# Patient Record
Sex: FEMALE | Race: WHITE | NOT HISPANIC OR LATINO
[De-identification: names, ages, dates, MRNs, and addresses within clinical notes are randomized per-mention and may not be internally consistent; named-entity substitution may affect disease eponyms.]

---

## 2017-06-14 ENCOUNTER — APPOINTMENT (OUTPATIENT)
Dept: ANTEPARTUM | Facility: CLINIC | Age: 42
End: 2017-06-14

## 2017-06-14 ENCOUNTER — ASOB RESULT (OUTPATIENT)
Age: 42
End: 2017-06-14

## 2017-08-01 ENCOUNTER — OUTPATIENT (OUTPATIENT)
Dept: OUTPATIENT SERVICES | Facility: HOSPITAL | Age: 42
LOS: 1 days | End: 2017-08-01
Payer: COMMERCIAL

## 2017-08-01 DIAGNOSIS — O26.899 OTHER SPECIFIED PREGNANCY RELATED CONDITIONS, UNSPECIFIED TRIMESTER: ICD-10-CM

## 2017-08-01 DIAGNOSIS — Z3A.00 WEEKS OF GESTATION OF PREGNANCY NOT SPECIFIED: ICD-10-CM

## 2017-08-01 PROCEDURE — 59025 FETAL NON-STRESS TEST: CPT

## 2017-08-01 PROCEDURE — 59025 FETAL NON-STRESS TEST: CPT | Mod: 26

## 2017-08-01 PROCEDURE — G0463: CPT

## 2017-09-20 ENCOUNTER — OUTPATIENT (OUTPATIENT)
Dept: OUTPATIENT SERVICES | Facility: HOSPITAL | Age: 42
LOS: 1 days | End: 2017-09-20
Payer: COMMERCIAL

## 2017-09-20 DIAGNOSIS — O26.899 OTHER SPECIFIED PREGNANCY RELATED CONDITIONS, UNSPECIFIED TRIMESTER: ICD-10-CM

## 2017-09-20 DIAGNOSIS — Z3A.00 WEEKS OF GESTATION OF PREGNANCY NOT SPECIFIED: ICD-10-CM

## 2017-09-20 LAB
APPEARANCE UR: CLEAR — SIGNIFICANT CHANGE UP
BACTERIA # UR AUTO: ABNORMAL /HPF
BILIRUB UR-MCNC: NEGATIVE — SIGNIFICANT CHANGE UP
COLOR SPEC: SIGNIFICANT CHANGE UP
DIFF PNL FLD: NEGATIVE — SIGNIFICANT CHANGE UP
EPI CELLS # UR: SIGNIFICANT CHANGE UP /HPF
GLUCOSE UR QL: NEGATIVE — SIGNIFICANT CHANGE UP
KETONES UR-MCNC: ABNORMAL
LEUKOCYTE ESTERASE UR-ACNC: ABNORMAL
NITRITE UR-MCNC: NEGATIVE — SIGNIFICANT CHANGE UP
PH UR: 6.5 — SIGNIFICANT CHANGE UP (ref 5–8)
PROT UR-MCNC: NEGATIVE — SIGNIFICANT CHANGE UP
RBC CASTS # UR COMP ASSIST: SIGNIFICANT CHANGE UP /HPF (ref 0–2)
SP GR SPEC: 1.01 — LOW (ref 1.01–1.02)
UROBILINOGEN FLD QL: NEGATIVE — SIGNIFICANT CHANGE UP
WBC UR QL: SIGNIFICANT CHANGE UP /HPF (ref 0–5)

## 2017-09-20 PROCEDURE — 59025 FETAL NON-STRESS TEST: CPT | Mod: 26

## 2017-09-20 PROCEDURE — G0463: CPT

## 2017-09-20 PROCEDURE — 59025 FETAL NON-STRESS TEST: CPT

## 2017-09-20 PROCEDURE — 87086 URINE CULTURE/COLONY COUNT: CPT

## 2017-09-20 PROCEDURE — 81001 URINALYSIS AUTO W/SCOPE: CPT

## 2017-09-21 LAB
CULTURE RESULTS: NO GROWTH — SIGNIFICANT CHANGE UP
SPECIMEN SOURCE: SIGNIFICANT CHANGE UP

## 2017-10-17 ENCOUNTER — OUTPATIENT (OUTPATIENT)
Dept: OUTPATIENT SERVICES | Facility: HOSPITAL | Age: 42
LOS: 1 days | End: 2017-10-17
Payer: COMMERCIAL

## 2017-10-17 DIAGNOSIS — O26.899 OTHER SPECIFIED PREGNANCY RELATED CONDITIONS, UNSPECIFIED TRIMESTER: ICD-10-CM

## 2017-10-17 DIAGNOSIS — Z3A.00 WEEKS OF GESTATION OF PREGNANCY NOT SPECIFIED: ICD-10-CM

## 2017-10-17 PROCEDURE — 59025 FETAL NON-STRESS TEST: CPT | Mod: 26

## 2017-10-17 PROCEDURE — G0463: CPT

## 2017-10-17 PROCEDURE — 59025 FETAL NON-STRESS TEST: CPT

## 2017-10-31 ENCOUNTER — INPATIENT (INPATIENT)
Facility: HOSPITAL | Age: 42
LOS: 2 days | Discharge: HOME | End: 2017-11-03
Attending: OBSTETRICS & GYNECOLOGY | Admitting: OBSTETRICS & GYNECOLOGY
Payer: COMMERCIAL

## 2017-10-31 VITALS — WEIGHT: 171.96 LBS | HEIGHT: 67 IN

## 2017-10-31 DIAGNOSIS — Z00.00 ENCOUNTER FOR GENERAL ADULT MEDICAL EXAMINATION WITHOUT ABNORMAL FINDINGS: ICD-10-CM

## 2017-10-31 LAB
BASOPHILS # BLD AUTO: 0 K/UL — SIGNIFICANT CHANGE UP (ref 0–0.2)
BASOPHILS NFR BLD AUTO: 0.3 % — SIGNIFICANT CHANGE UP (ref 0–2)
EOSINOPHIL # BLD AUTO: 0.1 K/UL — SIGNIFICANT CHANGE UP (ref 0–0.5)
EOSINOPHIL NFR BLD AUTO: 0.6 % — SIGNIFICANT CHANGE UP (ref 0–6)
HCT VFR BLD CALC: 34.9 % — SIGNIFICANT CHANGE UP (ref 34.5–45)
HGB BLD-MCNC: 12.2 G/DL — SIGNIFICANT CHANGE UP (ref 11.5–15.5)
LYMPHOCYTES # BLD AUTO: 2.1 K/UL — SIGNIFICANT CHANGE UP (ref 1–3.3)
LYMPHOCYTES # BLD AUTO: 25.3 % — SIGNIFICANT CHANGE UP (ref 13–44)
MCHC RBC-ENTMCNC: 32.9 PG — SIGNIFICANT CHANGE UP (ref 27–34)
MCHC RBC-ENTMCNC: 34.9 GM/DL — SIGNIFICANT CHANGE UP (ref 32–36)
MCV RBC AUTO: 94.4 FL — SIGNIFICANT CHANGE UP (ref 80–100)
MONOCYTES # BLD AUTO: 0.5 K/UL — SIGNIFICANT CHANGE UP (ref 0–0.9)
MONOCYTES NFR BLD AUTO: 6.2 % — SIGNIFICANT CHANGE UP (ref 2–14)
NEUTROPHILS # BLD AUTO: 5.7 K/UL — SIGNIFICANT CHANGE UP (ref 1.8–7.4)
NEUTROPHILS NFR BLD AUTO: 67.5 % — SIGNIFICANT CHANGE UP (ref 43–77)
PLATELET # BLD AUTO: 170 K/UL — SIGNIFICANT CHANGE UP (ref 150–400)
RBC # BLD: 3.7 M/UL — LOW (ref 3.8–5.2)
RBC # FLD: 11.4 % — SIGNIFICANT CHANGE UP (ref 10.3–14.5)
WBC # BLD: 8.5 K/UL — SIGNIFICANT CHANGE UP (ref 3.8–10.5)
WBC # FLD AUTO: 8.5 K/UL — SIGNIFICANT CHANGE UP (ref 3.8–10.5)

## 2017-10-31 RX ORDER — SODIUM CHLORIDE 9 MG/ML
1000 INJECTION, SOLUTION INTRAVENOUS
Qty: 0 | Refills: 0 | Status: DISCONTINUED | OUTPATIENT
Start: 2017-10-31 | End: 2017-11-01

## 2017-10-31 RX ORDER — SODIUM CHLORIDE 9 MG/ML
1000 INJECTION, SOLUTION INTRAVENOUS ONCE
Qty: 0 | Refills: 0 | Status: DISCONTINUED | OUTPATIENT
Start: 2017-10-31 | End: 2017-11-01

## 2017-10-31 RX ORDER — CITRIC ACID/SODIUM CITRATE 300-500 MG
15 SOLUTION, ORAL ORAL EVERY 4 HOURS
Qty: 0 | Refills: 0 | Status: DISCONTINUED | OUTPATIENT
Start: 2017-10-31 | End: 2017-11-01

## 2017-10-31 RX ORDER — OXYTOCIN 10 UNIT/ML
333.33 VIAL (ML) INJECTION
Qty: 20 | Refills: 0 | Status: DISCONTINUED | OUTPATIENT
Start: 2017-10-31 | End: 2017-11-01

## 2017-10-31 RX ADMIN — SODIUM CHLORIDE 125 MILLILITER(S): 9 INJECTION, SOLUTION INTRAVENOUS at 23:23

## 2017-11-01 LAB
BLD GP AB SCN SERPL QL: NEGATIVE — SIGNIFICANT CHANGE UP
RH IG SCN BLD-IMP: POSITIVE — SIGNIFICANT CHANGE UP
T PALLIDUM AB TITR SER: NEGATIVE — SIGNIFICANT CHANGE UP

## 2017-11-01 RX ORDER — AER TRAVELER 0.5 G/1
1 SOLUTION RECTAL; TOPICAL EVERY 4 HOURS
Qty: 0 | Refills: 0 | Status: DISCONTINUED | OUTPATIENT
Start: 2017-11-01 | End: 2017-11-03

## 2017-11-01 RX ORDER — IBUPROFEN 200 MG
600 TABLET ORAL EVERY 6 HOURS
Qty: 0 | Refills: 0 | Status: DISCONTINUED | OUTPATIENT
Start: 2017-11-01 | End: 2017-11-03

## 2017-11-01 RX ORDER — DOCUSATE SODIUM 100 MG
100 CAPSULE ORAL
Qty: 0 | Refills: 0 | Status: DISCONTINUED | OUTPATIENT
Start: 2017-11-01 | End: 2017-11-03

## 2017-11-01 RX ORDER — OXYCODONE HYDROCHLORIDE 5 MG/1
5 TABLET ORAL EVERY 4 HOURS
Qty: 0 | Refills: 0 | Status: DISCONTINUED | OUTPATIENT
Start: 2017-11-01 | End: 2017-11-03

## 2017-11-01 RX ORDER — PRAMOXINE HYDROCHLORIDE 150 MG/15G
1 AEROSOL, FOAM RECTAL EVERY 4 HOURS
Qty: 0 | Refills: 0 | Status: DISCONTINUED | OUTPATIENT
Start: 2017-11-01 | End: 2017-11-03

## 2017-11-01 RX ORDER — ACETAMINOPHEN 500 MG
975 TABLET ORAL EVERY 6 HOURS
Qty: 0 | Refills: 0 | Status: DISCONTINUED | OUTPATIENT
Start: 2017-11-01 | End: 2017-11-03

## 2017-11-01 RX ORDER — LANOLIN
1 OINTMENT (GRAM) TOPICAL EVERY 6 HOURS
Qty: 0 | Refills: 0 | Status: DISCONTINUED | OUTPATIENT
Start: 2017-11-01 | End: 2017-11-03

## 2017-11-01 RX ORDER — OXYTOCIN 10 UNIT/ML
41.67 VIAL (ML) INJECTION
Qty: 20 | Refills: 0 | Status: DISCONTINUED | OUTPATIENT
Start: 2017-11-01 | End: 2017-11-03

## 2017-11-01 RX ORDER — ACETAMINOPHEN 500 MG
975 TABLET ORAL EVERY 6 HOURS
Qty: 0 | Refills: 0 | Status: COMPLETED | OUTPATIENT
Start: 2017-11-01 | End: 2018-09-30

## 2017-11-01 RX ORDER — SODIUM CHLORIDE 9 MG/ML
3 INJECTION INTRAMUSCULAR; INTRAVENOUS; SUBCUTANEOUS EVERY 8 HOURS
Qty: 0 | Refills: 0 | Status: DISCONTINUED | OUTPATIENT
Start: 2017-11-01 | End: 2017-11-03

## 2017-11-01 RX ORDER — DIBUCAINE 1 %
1 OINTMENT (GRAM) RECTAL EVERY 4 HOURS
Qty: 0 | Refills: 0 | Status: DISCONTINUED | OUTPATIENT
Start: 2017-11-01 | End: 2017-11-03

## 2017-11-01 RX ORDER — MAGNESIUM HYDROXIDE 400 MG/1
30 TABLET, CHEWABLE ORAL
Qty: 0 | Refills: 0 | Status: DISCONTINUED | OUTPATIENT
Start: 2017-11-01 | End: 2017-11-03

## 2017-11-01 RX ORDER — SODIUM CHLORIDE 9 MG/ML
3 INJECTION INTRAMUSCULAR; INTRAVENOUS; SUBCUTANEOUS EVERY 8 HOURS
Qty: 0 | Refills: 0 | Status: DISCONTINUED | OUTPATIENT
Start: 2017-11-01 | End: 2017-11-01

## 2017-11-01 RX ORDER — DIBUCAINE 1 %
1 OINTMENT (GRAM) RECTAL EVERY 4 HOURS
Qty: 0 | Refills: 0 | Status: DISCONTINUED | OUTPATIENT
Start: 2017-11-01 | End: 2017-11-01

## 2017-11-01 RX ORDER — SIMETHICONE 80 MG/1
80 TABLET, CHEWABLE ORAL EVERY 6 HOURS
Qty: 0 | Refills: 0 | Status: DISCONTINUED | OUTPATIENT
Start: 2017-11-01 | End: 2017-11-03

## 2017-11-01 RX ORDER — TETANUS TOXOID, REDUCED DIPHTHERIA TOXOID AND ACELLULAR PERTUSSIS VACCINE, ADSORBED 5; 2.5; 8; 8; 2.5 [IU]/.5ML; [IU]/.5ML; UG/.5ML; UG/.5ML; UG/.5ML
0.5 SUSPENSION INTRAMUSCULAR ONCE
Qty: 0 | Refills: 0 | Status: DISCONTINUED | OUTPATIENT
Start: 2017-11-01 | End: 2017-11-03

## 2017-11-01 RX ORDER — AER TRAVELER 0.5 G/1
1 SOLUTION RECTAL; TOPICAL EVERY 4 HOURS
Qty: 0 | Refills: 0 | Status: DISCONTINUED | OUTPATIENT
Start: 2017-11-01 | End: 2017-11-01

## 2017-11-01 RX ORDER — BENZOCAINE 10 %
1 GEL (GRAM) MUCOUS MEMBRANE EVERY 6 HOURS
Qty: 0 | Refills: 0 | Status: DISCONTINUED | OUTPATIENT
Start: 2017-11-01 | End: 2017-11-03

## 2017-11-01 RX ORDER — KETOROLAC TROMETHAMINE 30 MG/ML
30 SYRINGE (ML) INJECTION ONCE
Qty: 0 | Refills: 0 | Status: DISCONTINUED | OUTPATIENT
Start: 2017-11-01 | End: 2017-11-03

## 2017-11-01 RX ORDER — IBUPROFEN 200 MG
600 TABLET ORAL EVERY 6 HOURS
Qty: 0 | Refills: 0 | Status: COMPLETED | OUTPATIENT
Start: 2017-11-01 | End: 2018-09-30

## 2017-11-01 RX ORDER — DIPHENHYDRAMINE HCL 50 MG
25 CAPSULE ORAL EVERY 6 HOURS
Qty: 0 | Refills: 0 | Status: DISCONTINUED | OUTPATIENT
Start: 2017-11-01 | End: 2017-11-03

## 2017-11-01 RX ORDER — GLYCERIN ADULT
1 SUPPOSITORY, RECTAL RECTAL AT BEDTIME
Qty: 0 | Refills: 0 | Status: DISCONTINUED | OUTPATIENT
Start: 2017-11-01 | End: 2017-11-03

## 2017-11-01 RX ORDER — KETOROLAC TROMETHAMINE 30 MG/ML
30 SYRINGE (ML) INJECTION ONCE
Qty: 0 | Refills: 0 | Status: DISCONTINUED | OUTPATIENT
Start: 2017-11-01 | End: 2017-11-01

## 2017-11-01 RX ORDER — PRAMOXINE HYDROCHLORIDE 150 MG/15G
1 AEROSOL, FOAM RECTAL EVERY 4 HOURS
Qty: 0 | Refills: 0 | Status: DISCONTINUED | OUTPATIENT
Start: 2017-11-01 | End: 2017-11-01

## 2017-11-01 RX ORDER — OXYCODONE HYDROCHLORIDE 5 MG/1
5 TABLET ORAL
Qty: 0 | Refills: 0 | Status: DISCONTINUED | OUTPATIENT
Start: 2017-11-01 | End: 2017-11-03

## 2017-11-01 RX ORDER — HYDROCORTISONE 1 %
1 OINTMENT (GRAM) TOPICAL EVERY 4 HOURS
Qty: 0 | Refills: 0 | Status: DISCONTINUED | OUTPATIENT
Start: 2017-11-01 | End: 2017-11-03

## 2017-11-01 RX ORDER — HYDROCORTISONE 1 %
1 OINTMENT (GRAM) TOPICAL EVERY 4 HOURS
Qty: 0 | Refills: 0 | Status: DISCONTINUED | OUTPATIENT
Start: 2017-11-01 | End: 2017-11-01

## 2017-11-01 RX ADMIN — Medication 30 MILLIGRAM(S): at 06:33

## 2017-11-01 RX ADMIN — Medication 125 MILLIUNIT(S)/MIN: at 06:03

## 2017-11-01 RX ADMIN — Medication 600 MILLIGRAM(S): at 22:51

## 2017-11-01 RX ADMIN — Medication 600 MILLIGRAM(S): at 15:30

## 2017-11-01 RX ADMIN — Medication 600 MILLIGRAM(S): at 14:36

## 2017-11-01 RX ADMIN — Medication 600 MILLIGRAM(S): at 23:40

## 2017-11-02 LAB
HCT VFR BLD CALC: 36.3 % — SIGNIFICANT CHANGE UP (ref 34.5–45)
HGB BLD-MCNC: 12.2 G/DL — SIGNIFICANT CHANGE UP (ref 11.5–15.5)

## 2017-11-02 PROCEDURE — 85027 COMPLETE CBC AUTOMATED: CPT

## 2017-11-02 PROCEDURE — 86850 RBC ANTIBODY SCREEN: CPT

## 2017-11-02 PROCEDURE — 86900 BLOOD TYPING SEROLOGIC ABO: CPT

## 2017-11-02 PROCEDURE — 86780 TREPONEMA PALLIDUM: CPT

## 2017-11-02 PROCEDURE — 86901 BLOOD TYPING SEROLOGIC RH(D): CPT

## 2017-11-02 PROCEDURE — 59050 FETAL MONITOR W/REPORT: CPT

## 2017-11-02 PROCEDURE — 59025 FETAL NON-STRESS TEST: CPT

## 2017-11-02 PROCEDURE — 85018 HEMOGLOBIN: CPT

## 2017-11-02 RX ADMIN — Medication 600 MILLIGRAM(S): at 12:07

## 2017-11-02 RX ADMIN — Medication 600 MILLIGRAM(S): at 18:41

## 2017-11-02 RX ADMIN — Medication 600 MILLIGRAM(S): at 06:47

## 2017-11-02 RX ADMIN — Medication 600 MILLIGRAM(S): at 12:50

## 2017-11-02 RX ADMIN — Medication 600 MILLIGRAM(S): at 18:00

## 2017-11-02 RX ADMIN — Medication 1 TABLET(S): at 12:07

## 2017-11-02 RX ADMIN — Medication 600 MILLIGRAM(S): at 06:19

## 2017-11-02 NOTE — PROGRESS NOTE ADULT - PROBLEM SELECTOR PLAN 1
- Pain well controlled, continue current pain regimen  - Increase ambulation, SCDs when not ambulating  - Continue regular diet    Evangelista Calhoun PGY-1

## 2017-11-02 NOTE — PROGRESS NOTE ADULT - ATTENDING COMMENTS
Agree with assessment and plan. Pt doing well without complaints.   Vitals stable. Exam Benign  - Routine post partum care

## 2017-11-02 NOTE — PROGRESS NOTE ADULT - SUBJECTIVE AND OBJECTIVE BOX
OB Progress Note:  PPD#1    S: 41yo PPD#1 s/p . Patient feels well. Pain is well controlled. She is tolerating a regular diet and passing flatus. She is voiding spontaneously, and ambulating without difficulty. Denies CP/SOB. Denies lightheadedness/dizziness. Denies N/V.    O:  Vitals:  Vital Signs Last 24 Hrs  T(C): 36.6 (2017 05:41), Max: 36.9 (2017 11:15)  T(F): 97.8 (2017 05:41), Max: 98.4 (2017 11:15)  HR: 79 (2017 05:41) (64 - 79)  BP: 114/67 (2017 05:41) (98/56 - 114/67)  BP(mean): --  RR: 18 (2017 05:41) (18 - 20)  SpO2: 100% (2017 09:20) (100% - 100%)    MEDICATIONS  (STANDING):  acetaminophen   Tablet. 975 milliGRAM(s) Oral every 6 hours  diphtheria/tetanus/pertussis (acellular) Vaccine (ADAcel) 0.5 milliLiter(s) IntraMuscular once  ibuprofen  Tablet 600 milliGRAM(s) Oral every 6 hours  ketorolac   Injectable 30 milliGRAM(s) IV Push once  oxyCODONE    IR 5 milliGRAM(s) Oral every 3 hours  oxytocin Infusion 41.667 milliUNIT(s)/Min (125 mL/Hr) IV Continuous <Continuous>  oxytocin Infusion 41.667 milliUNIT(s)/Min (125 mL/Hr) IV Continuous <Continuous>  prenatal multivitamin 1 Tablet(s) Oral daily  sodium chloride 0.9% lock flush 3 milliLiter(s) IV Push every 8 hours      Labs:  Blood type: A Positive  Rubella IgG: RPR: Negative                          12.2   -- >-----------< --    (  @ 07:29 )             36.3                        12.2   8.5 >-----------< 170    ( 10-31 @ 23:18 )             34.9                  Physical Exam:  General: NAD  Abdomen: soft, non-tender, non-distended, fundus firm  Vaginal: Lochia wnl  Extremities: No erythema/edema

## 2017-11-03 ENCOUNTER — TRANSCRIPTION ENCOUNTER (OUTPATIENT)
Age: 42
End: 2017-11-03

## 2017-11-03 VITALS
DIASTOLIC BLOOD PRESSURE: 63 MMHG | OXYGEN SATURATION: 97 % | HEART RATE: 74 BPM | TEMPERATURE: 98 F | RESPIRATION RATE: 18 BRPM | SYSTOLIC BLOOD PRESSURE: 102 MMHG

## 2017-11-03 RX ORDER — ACETAMINOPHEN 500 MG
3 TABLET ORAL
Qty: 0 | Refills: 0 | COMMUNITY
Start: 2017-11-03

## 2017-11-03 RX ORDER — SIMETHICONE 80 MG/1
1 TABLET, CHEWABLE ORAL
Qty: 0 | Refills: 0 | COMMUNITY
Start: 2017-11-03

## 2017-11-03 RX ORDER — DOCUSATE SODIUM 100 MG
1 CAPSULE ORAL
Qty: 0 | Refills: 0 | COMMUNITY
Start: 2017-11-03

## 2017-11-03 RX ORDER — IBUPROFEN 200 MG
1 TABLET ORAL
Qty: 0 | Refills: 0 | COMMUNITY
Start: 2017-11-03

## 2017-11-03 NOTE — DISCHARGE NOTE OB - NS OB DC PAIN RELIEF
Tylenol for minor pain as directed/If you have episitomy or tear repair, use warm water sitz bath for relief of discomfort/Other pain medication as prescribed and directed/If you have episitomy or tear repair, use anesthetic spray or cream as directed by your healthcare provider for relief of discomfort

## 2017-11-03 NOTE — DISCHARGE NOTE OB - CARE PROVIDER_API CALL
Thang Garcia (MD), Obstetrics and Gynecology  [address] [place] [address]  Phone: [phone]  Fax: [phone]

## 2017-11-03 NOTE — DISCHARGE NOTE OB - PATIENT PORTAL LINK FT
“You can access the [facility] Patient Portal, offered by [facility], by registering with the following website: [email or web address]”

## 2017-11-03 NOTE — DISCHARGE NOTE OB - CARE PLAN
Principal Discharge DX:	 (normal spontaneous vaginal delivery)  Goal:	routine post partum course  Instructions for follow-up, activity and diet:	follow-up in 6 weeks

## 2017-11-03 NOTE — DISCHARGE NOTE OB - MATERIALS PROVIDED
Birth Certificate Instructions/Back To Sleep Handout/[facility] [place] Screening Program/[facility] Hearing Screen Program/Breastfeeding Guide and Packet/Breastfeeding Log/Breastfeeding Mother’s Support Group Information/Guide to Postpartum Care

## 2018-01-15 ENCOUNTER — TRANSCRIPTION ENCOUNTER (OUTPATIENT)
Age: 43
End: 2018-01-15

## 2018-11-07 ENCOUNTER — TRANSCRIPTION ENCOUNTER (OUTPATIENT)
Age: 43
End: 2018-11-07

## 2019-05-27 NOTE — PATIENT PROFILE OB - NUTRITION PROFILE
pt is asymptomatic at the moment   echo noted ef 50%  will hold furosemide for now secondary to zehra and pt is euvolemic on exam   will give hydration no indicators present

## 2019-10-12 ENCOUNTER — TRANSCRIPTION ENCOUNTER (OUTPATIENT)
Age: 44
End: 2019-10-12

## 2021-04-21 NOTE — PATIENT PROFILE OB - HEIGHT IN FEET
5 Gabapentin Pregnancy And Lactation Text: This medication is Pregnancy Category C and isn't considered safe during pregnancy. It is excreted in breast milk.

## 2021-12-06 ENCOUNTER — TRANSCRIPTION ENCOUNTER (OUTPATIENT)
Age: 46
End: 2021-12-06

## 2022-08-30 ENCOUNTER — OFFICE (OUTPATIENT)
Dept: URBAN - METROPOLITAN AREA CLINIC 90 | Facility: CLINIC | Age: 47
Setting detail: OPHTHALMOLOGY
End: 2022-08-30
Payer: COMMERCIAL

## 2022-08-30 DIAGNOSIS — H01.002: ICD-10-CM

## 2022-08-30 DIAGNOSIS — H01.005: ICD-10-CM

## 2022-08-30 DIAGNOSIS — H16.423: ICD-10-CM

## 2022-08-30 DIAGNOSIS — H01.004: ICD-10-CM

## 2022-08-30 DIAGNOSIS — H01.001: ICD-10-CM

## 2022-08-30 DIAGNOSIS — H25.11: ICD-10-CM

## 2022-08-30 PROBLEM — H52.7 REFRACTIVE ERROR: Status: ACTIVE | Noted: 2022-08-30

## 2022-08-30 PROCEDURE — 99204 OFFICE O/P NEW MOD 45 MIN: CPT | Performed by: OPHTHALMOLOGY

## 2022-08-30 ASSESSMENT — KERATOMETRY
OD_K1POWER_DIOPTERS: 38.25
OD_K2POWER_DIOPTERS: 38.50
OD_AXISANGLE_DEGREES: 106
OS_K1POWER_DIOPTERS: 38.25
OS_AXISANGLE_DEGREES: 085
OS_K2POWER_DIOPTERS: 39.00

## 2022-08-30 ASSESSMENT — REFRACTION_CURRENTRX
OS_CYLINDER: -1.00
OD_SPHERE: -6.25
OS_SPHERE: +1.25
OD_AXIS: 180
OS_CYLINDER: 0.00
OD_OVR_VA: 20/
OD_AXIS: 100
OD_CYLINDER: 0.00
OD_CYLINDER: -0.50
OS_AXIS: 180
OD_SPHERE: +1.25
OD_OVR_VA: 20/
OS_OVR_VA: 20/
OS_SPHERE: -6.25
OS_AXIS: 40
OS_OVR_VA: 20/

## 2022-08-30 ASSESSMENT — CONFRONTATIONAL VISUAL FIELD TEST (CVF)
OS_FINDINGS: FULL
OD_FINDINGS: FULL

## 2022-08-30 ASSESSMENT — REFRACTION_MANIFEST
OS_SPHERE: -6.25
OD_AXIS: 115
OD_VA1: 20/20-1
OS_ADD: +2.00
OD_CYLINDER: -0.25
OD_ADD: +2.00
OS_VA2: 20/20
OS_CYLINDER: -0.50
OS_VA1: 20/20-1
OS_AXIS: 180
OD_VA2: 20/20
OD_SPHERE: -6.25

## 2022-08-30 ASSESSMENT — TONOMETRY
OS_IOP_MMHG: 19
OD_IOP_MMHG: 19

## 2022-08-30 ASSESSMENT — REFRACTION_AUTOREFRACTION
OS_SPHERE: -6.50
OD_CYLINDER: -0.25
OD_AXIS: 114
OS_AXIS: 180
OS_CYLINDER: -0.50
OD_SPHERE: -6.25

## 2022-08-30 ASSESSMENT — AXIALLENGTH_DERIVED
OS_AL: 28.8849
OD_AL: 28.9497
OS_AL: 29.0316
OD_AL: 28.9497

## 2022-08-30 ASSESSMENT — VASCULARIZATION
OS_VASCULARIZATION: SUPERIOR PANNUS
OD_VASCULARIZATION: SUPERIOR PANNUS

## 2022-08-30 ASSESSMENT — VISUAL ACUITY
OS_BCVA: 20/20
OD_BCVA: 20/20

## 2022-08-30 ASSESSMENT — LID EXAM ASSESSMENTS: OD_BLEPHARITIS: T

## 2022-08-30 ASSESSMENT — SPHEQUIV_DERIVED
OD_SPHEQUIV: -6.375
OS_SPHEQUIV: -6.5
OD_SPHEQUIV: -6.375
OS_SPHEQUIV: -6.75

## 2023-03-12 ENCOUNTER — NON-APPOINTMENT (OUTPATIENT)
Age: 48
End: 2023-03-12

## 2023-08-30 ENCOUNTER — OFFICE (OUTPATIENT)
Dept: URBAN - METROPOLITAN AREA CLINIC 90 | Facility: CLINIC | Age: 48
Setting detail: OPHTHALMOLOGY
End: 2023-08-30
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
## 2023-08-30 PROCEDURE — NO SHOW FE NO SHOW FEE: Performed by: OPHTHALMOLOGY

## 2023-12-12 ENCOUNTER — NON-APPOINTMENT (OUTPATIENT)
Age: 48
End: 2023-12-12

## 2023-12-26 ENCOUNTER — OFFICE (OUTPATIENT)
Dept: URBAN - METROPOLITAN AREA CLINIC 90 | Facility: CLINIC | Age: 48
Setting detail: OPHTHALMOLOGY
End: 2023-12-26
Payer: COMMERCIAL

## 2023-12-26 DIAGNOSIS — H25.11: ICD-10-CM

## 2023-12-26 DIAGNOSIS — H01.004: ICD-10-CM

## 2023-12-26 DIAGNOSIS — H01.005: ICD-10-CM

## 2023-12-26 DIAGNOSIS — H01.002: ICD-10-CM

## 2023-12-26 DIAGNOSIS — H16.423: ICD-10-CM

## 2023-12-26 DIAGNOSIS — H52.7: ICD-10-CM

## 2023-12-26 DIAGNOSIS — H01.001: ICD-10-CM

## 2023-12-26 PROCEDURE — 92014 COMPRE OPH EXAM EST PT 1/>: CPT | Performed by: OPHTHALMOLOGY

## 2023-12-26 ASSESSMENT — REFRACTION_CURRENTRX
OD_OVR_VA: 20/
OD_CYLINDER: 0.00
OS_AXIS: 180
OS_OVR_VA: 20/
OD_AXIS: 100
OS_CYLINDER: -1.00
OS_SPHERE: -6.25
OD_AXIS: 180
OD_SPHERE: +1.25
OS_AXIS: 40
OS_CYLINDER: 0.00
OD_OVR_VA: 20/
OS_SPHERE: +1.25
OS_OVR_VA: 20/
OD_CYLINDER: -0.50
OD_SPHERE: -6.25

## 2023-12-26 ASSESSMENT — REFRACTION_AUTOREFRACTION
OS_CYLINDER: -0.50
OD_SPHERE: +0.50
OD_CYLINDER: -0.25
OS_SPHERE: +0.50
OD_AXIS: 121
OS_AXIS: 022

## 2023-12-26 ASSESSMENT — VASCULARIZATION
OS_VASCULARIZATION: SUPERIOR PANNUS
OD_VASCULARIZATION: SUPERIOR PANNUS

## 2023-12-26 ASSESSMENT — SPHEQUIV_DERIVED
OS_SPHEQUIV: 0.25
OD_SPHEQUIV: -6.375
OD_SPHEQUIV: 0.375
OS_SPHEQUIV: -6.5

## 2023-12-26 ASSESSMENT — REFRACTION_MANIFEST
OS_AXIS: 180
OD_CYLINDER: -0.25
OD_AXIS: 115
OD_SPHERE: -6.25
OS_VA2: 20/20
OS_CYLINDER: -0.50
OS_SPHERE: -6.25
OS_ADD: +2.00
OS_VA1: 20/20-1
OD_VA2: 20/20
OD_ADD: +2.00
OD_VA1: 20/20-1

## 2023-12-26 ASSESSMENT — LID EXAM ASSESSMENTS: OD_BLEPHARITIS: T

## 2023-12-26 ASSESSMENT — CONFRONTATIONAL VISUAL FIELD TEST (CVF)
OS_FINDINGS: FULL
OD_FINDINGS: FULL

## 2025-01-12 ENCOUNTER — NON-APPOINTMENT (OUTPATIENT)
Age: 50
End: 2025-01-12

## 2025-07-09 ENCOUNTER — OFFICE (OUTPATIENT)
Facility: LOCATION | Age: 50
Setting detail: OPHTHALMOLOGY
End: 2025-07-09
Payer: COMMERCIAL

## 2025-07-09 DIAGNOSIS — H52.4: ICD-10-CM

## 2025-07-09 DIAGNOSIS — H01.004: ICD-10-CM

## 2025-07-09 DIAGNOSIS — H16.423: ICD-10-CM

## 2025-07-09 DIAGNOSIS — H01.002: ICD-10-CM

## 2025-07-09 DIAGNOSIS — H01.001: ICD-10-CM

## 2025-07-09 DIAGNOSIS — H52.7: ICD-10-CM

## 2025-07-09 DIAGNOSIS — H25.13: ICD-10-CM

## 2025-07-09 DIAGNOSIS — H01.005: ICD-10-CM

## 2025-07-09 PROCEDURE — 92014 COMPRE OPH EXAM EST PT 1/>: CPT | Performed by: OPHTHALMOLOGY

## 2025-07-09 PROCEDURE — 92015 DETERMINE REFRACTIVE STATE: CPT | Performed by: OPHTHALMOLOGY

## 2025-07-09 ASSESSMENT — CONFRONTATIONAL VISUAL FIELD TEST (CVF)
OD_FINDINGS: FULL
OS_FINDINGS: FULL

## 2025-07-09 ASSESSMENT — REFRACTION_CURRENTRX
OD_AXIS: 180
OS_OVR_VA: 20/
OD_OVR_VA: 20/
OD_AXIS: 100
OD_SPHERE: +1.25
OD_OVR_VA: 20/
OS_AXIS: 180
OS_AXIS: 157
OD_OVR_VA: 20/
OD_CYLINDER: -0.50
OS_AXIS: 40
OS_CYLINDER: 0.00
OD_CYLINDER: -0.50
OD_SPHERE: -6.25
OS_SPHERE: -6.25
OS_CYLINDER: -1.00
OS_SPHERE: -6.25
OS_OVR_VA: 20/
OS_OVR_VA: 20/
OS_SPHERE: +1.25
OD_SPHERE: -6.25
OS_CYLINDER: -0.25
OD_CYLINDER: 0.00
OD_AXIS: 108

## 2025-07-09 ASSESSMENT — REFRACTION_MANIFEST
OD_SPHERE: -6.25
OD_VA1: 20/20
OS_VA2: 20/20
OS_ADD: +2.00
OS_VA2: 20/20
OS_SPHERE: -6.00
OD_VA1: 20/20-1
OS_VA1: 20/20
OS_VA1: 20/20-1
OD_ADD: +2.00
OS_ADD: +2.00
OS_CYLINDER: SPH
OS_VA1: 20/20
OS_AXIS: 180
OD_CYLINDER: -0.25
OD_VA1: 20/20
OS_SPHERE: -6.25
OD_SPHERE: -5.75
OD_ADD: +2.50
OD_AXIS: 115
OD_ADD: +2.00
OS_CYLINDER: SPH
OS_SPHERE: -6.25
OD_VA2: 20/20
OD_SPHERE: -6.00
OD_CYLINDER: -0.25
OD_CYLINDER: SPH
OS_ADD: +2.50
OD_VA2: 20/20
OD_AXIS: 120
OS_CYLINDER: -0.50

## 2025-07-09 ASSESSMENT — KERATOMETRY
OD_K2POWER_DIOPTERS: 43.50
OD_AXISANGLE_DEGREES: 087
OS_AXISANGLE_DEGREES: 083
OD_K1POWER_DIOPTERS: 43.00
OS_K2POWER_DIOPTERS: 44.50
OS_K1POWER_DIOPTERS: 43.00

## 2025-07-09 ASSESSMENT — VISUAL ACUITY
OD_BCVA: 20/20
OS_BCVA: 20/20

## 2025-07-09 ASSESSMENT — REFRACTION_AUTOREFRACTION
OS_CYLINDER: -1.00
OS_AXIS: 170
OS_SPHERE: -5.50
OD_AXIS: 120
OD_CYLINDER: -0.25
OD_SPHERE: -5.50

## 2025-07-09 ASSESSMENT — VASCULARIZATION
OS_VASCULARIZATION: SUPERIOR PANNUS
OD_VASCULARIZATION: SUPERIOR PANNUS

## 2025-07-09 ASSESSMENT — LID EXAM ASSESSMENTS: OD_BLEPHARITIS: T
